# Patient Record
Sex: MALE | Race: WHITE
[De-identification: names, ages, dates, MRNs, and addresses within clinical notes are randomized per-mention and may not be internally consistent; named-entity substitution may affect disease eponyms.]

---

## 2022-08-09 NOTE — NUR
08/09/22 1333 Sandeep Jeong
CALL LIGHT WITHIN REACH. TETRACAINE AT 1322 IN LEFT EYE AND PLEDGETT
AT 1325

## 2022-12-18 ENCOUNTER — HOSPITAL ENCOUNTER (INPATIENT)
Dept: HOSPITAL 95 - ER | Age: 82
LOS: 3 days | DRG: 637 | End: 2022-12-21
Attending: INTERNAL MEDICINE | Admitting: INTERNAL MEDICINE
Payer: OTHER GOVERNMENT

## 2022-12-18 VITALS — HEIGHT: 72 IN | WEIGHT: 166.67 LBS | BODY MASS INDEX: 22.57 KG/M2

## 2022-12-18 DIAGNOSIS — E78.5: ICD-10-CM

## 2022-12-18 DIAGNOSIS — E11.11: Primary | ICD-10-CM

## 2022-12-18 DIAGNOSIS — R31.9: ICD-10-CM

## 2022-12-18 DIAGNOSIS — Z88.1: ICD-10-CM

## 2022-12-18 DIAGNOSIS — Z79.01: ICD-10-CM

## 2022-12-18 DIAGNOSIS — Z88.2: ICD-10-CM

## 2022-12-18 DIAGNOSIS — I10: ICD-10-CM

## 2022-12-18 DIAGNOSIS — N20.0: ICD-10-CM

## 2022-12-18 DIAGNOSIS — I48.91: ICD-10-CM

## 2022-12-18 DIAGNOSIS — J69.0: ICD-10-CM

## 2022-12-18 DIAGNOSIS — G93.41: ICD-10-CM

## 2022-12-18 DIAGNOSIS — Z28.21: ICD-10-CM

## 2022-12-18 DIAGNOSIS — Z91.038: ICD-10-CM

## 2022-12-18 DIAGNOSIS — F32.A: ICD-10-CM

## 2022-12-18 DIAGNOSIS — G47.33: ICD-10-CM

## 2022-12-18 DIAGNOSIS — M19.90: ICD-10-CM

## 2022-12-18 DIAGNOSIS — Z51.5: ICD-10-CM

## 2022-12-18 DIAGNOSIS — C22.7: ICD-10-CM

## 2022-12-18 DIAGNOSIS — Z79.899: ICD-10-CM

## 2022-12-18 DIAGNOSIS — Z20.822: ICD-10-CM

## 2022-12-18 DIAGNOSIS — E87.6: ICD-10-CM

## 2022-12-18 DIAGNOSIS — Z66: ICD-10-CM

## 2022-12-18 DIAGNOSIS — E11.9: ICD-10-CM

## 2022-12-18 LAB
ALBUMIN SERPL BCP-MCNC: 2.3 G/DL (ref 3.4–5)
ALBUMIN/GLOB SERPL: 0.6 {RATIO} (ref 0.8–1.8)
ALT SERPL W P-5'-P-CCNC: 30 U/L (ref 12–78)
ANION GAP SERPL CALCULATED.4IONS-SCNC: 21 MMOL/L (ref 6–16)
ANION GAP SERPL CALCULATED.4IONS-SCNC: 24 MMOL/L (ref 6–16)
AST SERPL W P-5'-P-CCNC: 37 U/L (ref 12–37)
BASOPHILS # BLD: 0 K/MM3 (ref 0–0.23)
BASOPHILS NFR BLD: 0 % (ref 0–2)
BILIRUB SERPL-MCNC: 1.2 MG/DL (ref 0.1–1)
BUN SERPL-MCNC: 46 MG/DL (ref 8–24)
BUN SERPL-MCNC: 46 MG/DL (ref 8–24)
CALCIUM SERPL-MCNC: 8.7 MG/DL (ref 8.5–10.1)
CALCIUM SERPL-MCNC: 9.6 MG/DL (ref 8.5–10.1)
CHLORIDE SERPL-SCNC: 91 MMOL/L (ref 98–108)
CHLORIDE SERPL-SCNC: 97 MMOL/L (ref 98–108)
CO2 SERPL-SCNC: 18 MMOL/L (ref 21–32)
CO2 SERPL-SCNC: 19 MMOL/L (ref 21–32)
CREAT SERPL-MCNC: 1.09 MG/DL (ref 0.6–1.2)
CREAT SERPL-MCNC: 1.14 MG/DL (ref 0.6–1.2)
DEPRECATED RDW RBC AUTO: 51.8 FL (ref 35.1–46.3)
EOSINOPHIL # BLD: 0 K/MM3 (ref 0–0.68)
EOSINOPHIL NFR BLD: 0 % (ref 0–6)
ERYTHROCYTE [DISTWIDTH] IN BLOOD BY AUTOMATED COUNT: 14.6 % (ref 11.7–14.2)
FLUAV RNA SPEC QL NAA+PROBE: NEGATIVE
FLUBV RNA SPEC QL NAA+PROBE: NEGATIVE
GLOBULIN SER CALC-MCNC: 3.8 G/DL (ref 2.2–4)
GLUCOSE SERPL-MCNC: 876 MG/DL (ref 70–99)
GLUCOSE SERPL-MCNC: 884 MG/DL (ref 70–99)
GLUCOSE SERPL-MCNC: 916 MG/DL (ref 70–99)
HCT VFR BLD AUTO: 40.5 % (ref 37–53)
HGB BLD-MCNC: 13.6 G/DL (ref 13.5–17.5)
LYMPHOCYTES # BLD: 0.16 K/MM3 (ref 0.84–5.2)
LYMPHOCYTES NFR BLD: 1 % (ref 21–46)
MAGNESIUM SERPL-MCNC: 2.5 MG/DL (ref 1.6–2.4)
MCHC RBC AUTO-ENTMCNC: 33.6 G/DL (ref 31.5–36.5)
MCV RBC AUTO: 98 FL (ref 80–100)
MONOCYTES # BLD: 0.16 K/MM3 (ref 0.16–1.47)
MONOCYTES NFR BLD: 1 % (ref 4–13)
NEUTS BAND NFR BLD MANUAL: 8 % (ref 0–8)
NEUTS SEG # BLD MANUAL: 16.6 K/MM3 (ref 1.96–9.15)
NEUTS SEG NFR BLD MANUAL: 90 % (ref 41–73)
NRBC # BLD AUTO: 0 K/MM3 (ref 0–0.02)
NRBC BLD AUTO-RTO: 0 /100 WBC (ref 0–0.2)
OSMOLALITY SERPL: 346 MOS/KG (ref 275–300)
PH BLDV: 7.32 [PH] (ref 7.34–7.37)
PLATELET # BLD AUTO: 281 K/MM3 (ref 150–400)
POTASSIUM SERPL-SCNC: 2.4 MMOL/L (ref 3.5–5.5)
POTASSIUM SERPL-SCNC: 3 MMOL/L (ref 3.5–5.5)
PROT SERPL-MCNC: 6.1 G/DL (ref 6.4–8.2)
RSV RNA SPEC QL NAA+PROBE: NEGATIVE
SARS-COV-2 RNA RESP QL NAA+PROBE: NEGATIVE
SODIUM SERPL-SCNC: 133 MMOL/L (ref 136–145)
SODIUM SERPL-SCNC: 137 MMOL/L (ref 136–145)
TOTAL CELLS COUNTED BLD: 100

## 2022-12-18 PROCEDURE — A9270 NON-COVERED ITEM OR SERVICE: HCPCS

## 2022-12-18 PROCEDURE — C1751 CATH, INF, PER/CENT/MIDLINE: HCPCS

## 2022-12-19 LAB
ALBUMIN SERPL BCP-MCNC: 1.9 G/DL (ref 3.4–5)
ALBUMIN SERPL BCP-MCNC: 2.2 G/DL (ref 3.4–5)
ALBUMIN/GLOB SERPL: 0.6 {RATIO} (ref 0.8–1.8)
ALBUMIN/GLOB SERPL: 0.6 {RATIO} (ref 0.8–1.8)
ALT SERPL W P-5'-P-CCNC: 28 U/L (ref 12–78)
ALT SERPL W P-5'-P-CCNC: 29 U/L (ref 12–78)
ANION GAP SERPL CALCULATED.4IONS-SCNC: 11 MMOL/L (ref 6–16)
ANION GAP SERPL CALCULATED.4IONS-SCNC: 18 MMOL/L (ref 6–16)
ANION GAP SERPL CALCULATED.4IONS-SCNC: 23 MMOL/L (ref 6–16)
ANION GAP SERPL CALCULATED.4IONS-SCNC: 25 MMOL/L (ref 6–16)
ANION GAP SERPL CALCULATED.4IONS-SCNC: 8 MMOL/L (ref 6–16)
ANION GAP SERPL CALCULATED.4IONS-SCNC: 9 MMOL/L (ref 6–16)
AST SERPL W P-5'-P-CCNC: 25 U/L (ref 12–37)
AST SERPL W P-5'-P-CCNC: 30 U/L (ref 12–37)
BASOPHILS # BLD AUTO: 0.09 K/MM3 (ref 0–0.23)
BASOPHILS NFR BLD AUTO: 1 % (ref 0–2)
BILIRUB SERPL-MCNC: 0.8 MG/DL (ref 0.1–1)
BILIRUB SERPL-MCNC: 1.2 MG/DL (ref 0.1–1)
BUN SERPL-MCNC: 36 MG/DL (ref 8–24)
BUN SERPL-MCNC: 43 MG/DL (ref 8–24)
BUN SERPL-MCNC: 44 MG/DL (ref 8–24)
BUN SERPL-MCNC: 45 MG/DL (ref 8–24)
BUN SERPL-MCNC: 45 MG/DL (ref 8–24)
BUN SERPL-MCNC: 48 MG/DL (ref 8–24)
CALCIUM SERPL-MCNC: 8.3 MG/DL (ref 8.5–10.1)
CALCIUM SERPL-MCNC: 8.9 MG/DL (ref 8.5–10.1)
CALCIUM SERPL-MCNC: 9.1 MG/DL (ref 8.5–10.1)
CALCIUM SERPL-MCNC: 9.3 MG/DL (ref 8.5–10.1)
CALCIUM SERPL-MCNC: 9.4 MG/DL (ref 8.5–10.1)
CALCIUM SERPL-MCNC: 9.5 MG/DL (ref 8.5–10.1)
CHLORIDE SERPL-SCNC: 100 MMOL/L (ref 98–108)
CHLORIDE SERPL-SCNC: 107 MMOL/L (ref 98–108)
CHLORIDE SERPL-SCNC: 112 MMOL/L (ref 98–108)
CHLORIDE SERPL-SCNC: 114 MMOL/L (ref 98–108)
CHLORIDE SERPL-SCNC: 119 MMOL/L (ref 98–108)
CHLORIDE SERPL-SCNC: 98 MMOL/L (ref 98–108)
CO2 SERPL-SCNC: 16 MMOL/L (ref 21–32)
CO2 SERPL-SCNC: 19 MMOL/L (ref 21–32)
CO2 SERPL-SCNC: 20 MMOL/L (ref 21–32)
CO2 SERPL-SCNC: 24 MMOL/L (ref 21–32)
CO2 SERPL-SCNC: 25 MMOL/L (ref 21–32)
CO2 SERPL-SCNC: 26 MMOL/L (ref 21–32)
CREAT SERPL-MCNC: 0.86 MG/DL (ref 0.6–1.2)
CREAT SERPL-MCNC: 0.94 MG/DL (ref 0.6–1.2)
CREAT SERPL-MCNC: 1.03 MG/DL (ref 0.6–1.2)
CREAT SERPL-MCNC: 1.1 MG/DL (ref 0.6–1.2)
CREAT SERPL-MCNC: 1.12 MG/DL (ref 0.6–1.2)
CREAT SERPL-MCNC: 1.14 MG/DL (ref 0.6–1.2)
DEPRECATED RDW RBC AUTO: 54 FL (ref 35.1–46.3)
EOSINOPHIL # BLD AUTO: 0.23 K/MM3 (ref 0–0.68)
EOSINOPHIL NFR BLD AUTO: 1 % (ref 0–6)
ERYTHROCYTE [DISTWIDTH] IN BLOOD BY AUTOMATED COUNT: 14.9 % (ref 11.7–14.2)
GLOBULIN SER CALC-MCNC: 3.3 G/DL (ref 2.2–4)
GLOBULIN SER CALC-MCNC: 3.4 G/DL (ref 2.2–4)
GLUCOSE SERPL-MCNC: 211 MG/DL (ref 70–99)
GLUCOSE SERPL-MCNC: 277 MG/DL (ref 70–99)
GLUCOSE SERPL-MCNC: 436 MG/DL (ref 70–99)
GLUCOSE SERPL-MCNC: 446 MG/DL (ref 70–99)
GLUCOSE SERPL-MCNC: 593 MG/DL (ref 70–99)
GLUCOSE SERPL-MCNC: 674 MG/DL (ref 70–99)
GLUCOSE SERPL-MCNC: 826 MG/DL (ref 70–99)
GLUCOSE SERPL-MCNC: 849 MG/DL (ref 70–99)
GLUCOSE SERPL-MCNC: 873 MG/DL (ref 70–99)
GLUCOSE UR-MCNC: (no result) MG/DL
GLUCOSE UR-MCNC: (no result) MG/DL
HCT VFR BLD AUTO: 38.9 % (ref 37–53)
HGB BLD-MCNC: 12.7 G/DL (ref 13.5–17.5)
IMM GRANULOCYTES # BLD AUTO: 0.06 K/MM3 (ref 0–0.1)
IMM GRANULOCYTES NFR BLD AUTO: 0 % (ref 0–1)
KETONES UR STRIP-MCNC: (no result) MG/DL
KETONES UR STRIP-MCNC: (no result) MG/DL
LEUKOCYTE ESTERASE UR QL STRIP: (no result)
LEUKOCYTE ESTERASE UR QL STRIP: (no result)
LYMPHOCYTES # BLD AUTO: 0.39 K/MM3 (ref 0.84–5.2)
LYMPHOCYTES NFR BLD AUTO: 2 % (ref 21–46)
MAGNESIUM SERPL-MCNC: 2.4 MG/DL (ref 1.6–2.4)
MAGNESIUM SERPL-MCNC: 2.7 MG/DL (ref 1.6–2.4)
MAGNESIUM SERPL-MCNC: 2.7 MG/DL (ref 1.6–2.4)
MCHC RBC AUTO-ENTMCNC: 32.6 G/DL (ref 31.5–36.5)
MCV RBC AUTO: 100 FL (ref 80–100)
MONOCYTES # BLD AUTO: 0.51 K/MM3 (ref 0.16–1.47)
MONOCYTES NFR BLD AUTO: 3 % (ref 4–13)
NEUTROPHILS # BLD AUTO: 17.05 K/MM3 (ref 1.96–9.15)
NEUTROPHILS NFR BLD AUTO: 93 % (ref 41–73)
NRBC # BLD AUTO: 0 K/MM3 (ref 0–0.02)
NRBC BLD AUTO-RTO: 0 /100 WBC (ref 0–0.2)
PH BLDA: 7.37 [PH] (ref 7.35–7.45)
PH BLDA: 7.42 [PH] (ref 7.35–7.45)
PHOSPHATE SERPL-MCNC: 2.1 MG/DL (ref 2.5–4.9)
PHOSPHATE SERPL-MCNC: 2.2 MG/DL (ref 2.5–4.9)
PHOSPHATE SERPL-MCNC: 3.3 MG/DL (ref 2.5–4.9)
PLATELET # BLD AUTO: 284 K/MM3 (ref 150–400)
POTASSIUM SERPL-SCNC: 2.6 MMOL/L (ref 3.5–5.5)
POTASSIUM SERPL-SCNC: 2.6 MMOL/L (ref 3.5–5.5)
POTASSIUM SERPL-SCNC: 3 MMOL/L (ref 3.5–5.5)
POTASSIUM SERPL-SCNC: 3.4 MMOL/L (ref 3.5–5.5)
POTASSIUM SERPL-SCNC: 3.4 MMOL/L (ref 3.5–5.5)
POTASSIUM SERPL-SCNC: 4.2 MMOL/L (ref 3.5–5.5)
PROT SERPL-MCNC: 5.2 G/DL (ref 6.4–8.2)
PROT SERPL-MCNC: 5.6 G/DL (ref 6.4–8.2)
PROT UR STRIP-MCNC: (no result) MG/DL
PROT UR STRIP-MCNC: (no result) MG/DL
RBC #/AREA URNS HPF: (no result) /HPF (ref 0–2)
RBC #/AREA URNS HPF: (no result) /HPF (ref 0–2)
SODIUM SERPL-SCNC: 141 MMOL/L (ref 136–145)
SODIUM SERPL-SCNC: 141 MMOL/L (ref 136–145)
SODIUM SERPL-SCNC: 144 MMOL/L (ref 136–145)
SODIUM SERPL-SCNC: 148 MMOL/L (ref 136–145)
SODIUM SERPL-SCNC: 148 MMOL/L (ref 136–145)
SODIUM SERPL-SCNC: 152 MMOL/L (ref 136–145)
SP GR SPEC: 1.01 (ref 1–1.02)
SP GR SPEC: 1.01 (ref 1–1.02)
URN SPEC COLLECT METH UR: (no result)
UROBILINOGEN UR STRIP-MCNC: (no result) MG/DL
UROBILINOGEN UR STRIP-MCNC: (no result) MG/DL

## 2022-12-19 NOTE — NUR
SUMMARY
 
NEURO: PT A/O X0. PT STATES IT IS 1926 AND THAT HIS NAME IS CHANTAL. PT ABLE TO
WEAKLY FOLLOW COMMANDS IN ALL EXTREMETIES WITH EQUAL STRENGTH. PER ER REPORT,
PT'S SON STATED HE HAD STROKE LIKE SYMPTOMS WITH A L SIDE FACIAL DROOP ON
12/12/22 AND THEN FELL ON 12/14/22, PT LOST APPETITE AND SON HAD TO LIFT PT TO
THE BATHROOM. PUPILS EQUAL AND REACTIVE. VISUAL HALLUCINATIONS PRESENT.
 
RESPIRATORY: IMAGING SHOWED LOWER LOBE PNA. PT WAS ON 6L NC FROM ER, HAS SINCE
INCREASED TO 14 L HFNC- MANAGED BY RESPIRATORY CARE. PT'S EXTREMETIES ARE
DUSKY AND CYANOTIC, UNABLE TO FIND AN SPO2 PLETH DESPITE ATTEMPTS WITH
DIFFERENT PULSE OX. ABG DRAWN SHOWED PO2 OF 53, REPEAT PO2 OF 67. PROVIDER
MADE AWARE. RT STATING PT DOESNT NEED AIRVO AT THIS TIME. PT HAS HX OF SLEEP
APNEA. NO COUGH OR SECRETIONS NOTED. NEGATIVE RESPIRATORY PANEL.
 
CARDIO: PT HAS A HX OF AFIB, PT WAS IN AFIB WITH RVR- 5MG METOPROLOL PUSH
GIVEN. FAINT RADIAL PULSES. DOPPLER BLE. +1 EDEMA BLE. BEAR HUGGER TO BLE.
TEMP 96.5.
 
GI: SEE IMAGING- BOWEL OBSTRUCTION/ PERFORATION NOT EXCLUDED. PANCREATIC MASS
SEEN ON BEDSIDE ULTRASOUND PER ER REPORT. NO BM THIS SHIFT. CANCER ANTIGEN
19-9 SENT, SON UPDATED ON CONCERN.
 
: CONDOM CATHETER IN PLACE, PINK/JAMSHID URINE. INCONTINENT.
 
SKIN: FOREHEAD BRUISING AND LARGE CRUSTED SCAB- PHOTOS IN CHART.
STAGE THREE PRESSURE AREAS AND
LARGE AREA OF MACERATION ON SACRUM- PHOTOS IN CHART. PT MOTTLED TO HIP. COOL
CYANOTIC, EXTREMETIES. DRY ORAL MUCOSA.
 
PT'S POTASSIUM IS CRITICALLY LOW, BLOOD SUGARS CRITICALLY HIGH. PT HAS
RECIEVED 80MEQ OF POTASSIUM AND KPHOS. UNABLE TOLERATE PO

## 2022-12-19 NOTE — NUR
ASSUMED CARE
PT IS A&O X1; EXTREMELY CONFUSED AND ORIENTED TO SELF. AGITATED W/ ANY
PHYSICAL INTERVENTION. SPO2 >92% ON 14L HF NC. MAP >65. INSULIN GTT @
4UNITS/HR; NS @ 75ML/HR; K-DUR @ 30MLS/HR. INSULIN AND FLUIDS STARTED AT 0800
PER DR BALDERRAMA. PT INCONTINENT W/ SMALL AMOUNTS OF URINATION AT A TIME; URINE IS
BRIGHT RED.
 
PUPILS EQUAL BILATERALLY. PT UNABLE TO BE REORIENTED. LEFT EYELID STAYS
CLOSED/PARTIALLY OPEN WHILE RIGHT EYE OPENS WNL. PT PULLS AT RESTRAINT W/
RA MORE THAN LA; LA TENSE. PT IS TREMULOUS.

## 2022-12-19 NOTE — NUR
SHIFT SUMMARY
NO ACUTE CHANGES IN MENTATION SINCE UPDATE. SPO2 >92% ON 10L NC; MAP >60.
INSULIN GTT @ 2UNITS/HR; NS @ 75MLS/HR; KPHOS 20MM RUNNING OVER 4 HOURS. URINE
HAS GOTTEN DARKER FROM BRIGHT RED TO COLA COLORED OVER SHIFT. LUNG SOUNDS
CLEAR W/ DIM BASES. UGALDE PATENT AND DRAINING TO GRAVITY. PT GIVEN 1 TIME DOES
OF ATIVAN 0.5MG; PT WAS CALM AND RESTING FOR MAJORITY OF SHIFT; WOKE UP AT
1700 AGITATED AND ATTEMPTING TO PULL LINES.

## 2022-12-20 LAB
ALBUMIN SERPL BCP-MCNC: 1.8 G/DL (ref 3.4–5)
ANION GAP SERPL CALCULATED.4IONS-SCNC: 11 MMOL/L (ref 6–16)
ANION GAP SERPL CALCULATED.4IONS-SCNC: 6 MMOL/L (ref 6–16)
ANION GAP SERPL CALCULATED.4IONS-SCNC: 7 MMOL/L (ref 6–16)
BUN SERPL-MCNC: 35 MG/DL (ref 8–24)
BUN SERPL-MCNC: 36 MG/DL (ref 8–24)
BUN SERPL-MCNC: 41 MG/DL (ref 8–24)
CA-I BLD-SCNC: 1.09 MMOL/L (ref 1.1–1.46)
CALCIUM SERPL-MCNC: 8.9 MG/DL (ref 8.5–10.1)
CALCIUM SERPL-MCNC: 9 MG/DL (ref 8.5–10.1)
CALCIUM SERPL-MCNC: 9.2 MG/DL (ref 8.5–10.1)
CHLORIDE BLD-SCNC: 92 MMOL/L (ref 98–108)
CHLORIDE SERPL-SCNC: 115 MMOL/L (ref 98–108)
CHLORIDE SERPL-SCNC: 116 MMOL/L (ref 98–108)
CHLORIDE SERPL-SCNC: 118 MMOL/L (ref 98–108)
CO2 BLD-SCNC: 20 MMOL/L (ref 21–32)
CO2 SERPL-SCNC: 23 MMOL/L (ref 21–32)
CO2 SERPL-SCNC: 26 MMOL/L (ref 21–32)
CO2 SERPL-SCNC: 28 MMOL/L (ref 21–32)
CREAT BLD-MCNC: 1.2 MG/DL (ref 0.8–1.3)
CREAT SERPL-MCNC: 0.82 MG/DL (ref 0.6–1.2)
CREAT SERPL-MCNC: 0.85 MG/DL (ref 0.6–1.2)
CREAT SERPL-MCNC: 0.86 MG/DL (ref 0.6–1.2)
GLUCOSE BLDC GLUCOMTR-MCNC: >700 MG/DL (ref 70–99)
GLUCOSE SERPL-MCNC: 155 MG/DL (ref 70–99)
GLUCOSE SERPL-MCNC: 288 MG/DL (ref 70–99)
GLUCOSE SERPL-MCNC: 53 MG/DL (ref 70–99)
HCT VFR BLD CALC: 45 % (ref 41–53)
HGB BLD CALC-MCNC: 15.3 G/DL (ref 13.5–17.5)
MAGNESIUM SERPL-MCNC: 2.1 MG/DL (ref 1.6–2.4)
PHOSPHATE SERPL-MCNC: 1.4 MG/DL (ref 2.5–4.9)
POTASSIUM BLD-SCNC: 3.1 MMOL/L (ref 3.5–5.5)
POTASSIUM SERPL-SCNC: 3.2 MMOL/L (ref 3.5–5.5)
POTASSIUM SERPL-SCNC: 3.5 MMOL/L (ref 3.5–5.5)
POTASSIUM SERPL-SCNC: 4 MMOL/L (ref 3.5–5.5)
SODIUM BLD-SCNC: 131 MMOL/L (ref 135–148)
SODIUM SERPL-SCNC: 149 MMOL/L (ref 136–145)
SODIUM SERPL-SCNC: 150 MMOL/L (ref 136–145)
SODIUM SERPL-SCNC: 151 MMOL/L (ref 136–145)

## 2022-12-20 NOTE — NUR
PT ARRIVED TO UNIT AT 2230 ACCOMPANIED BY GRANDSON AND GRANDSON'S WIFE,
HAYES, WHO IS A HOSPICE NURSE. SHE IS REQUESTING TO STAY WITH HIM AND WOULD
LIKE TO PROVIDE PM CARE, IF SHE IS HERE WHEN HE PASSES. SON, JUAN DIEGO, IS TO BE
NOTIFIED UPON PASSING. ISABEL'S MORTUARY IS  HOME. HAYES REQUESTS PT
NOT BE DISTURBED FOR REPOSITIONING, ONLY FOR CHANGES IF SOILED. EMILIE IS A POW
AND, IN HER OPINION, IS HAVING SEVERE PTSD, AS HE HAS BEEN COMBATIVE IN THE
PAST, WHEN NOT MEDICATED WITH ROXANOL AND ATIVAN.

## 2022-12-20 NOTE — NUR
END OF SHIFT REPORT:
PER MD INSULIN SHUT OFF UNTIL >200. D5 1/2 NS AT 75 ML/HR. SEE REVIEW FOR PTS
LAST BLOOD GLUCOSE LEVELS.
NEURO: PT INTERMITTENTLY ORIENTED TO SELF AND OCCASIONAL FOLLOWS COMMAND:
WIGGLE YOUR TOES OR GIVE ME A THUMBS UP. LABILE. WILL BE IMPULSIVE AND PULL
AND LINES AND WIRES. ATIVAN 0.5MG IV DOSES APPEAR TO ALLEVIATE THRASHING AND
SCREAMING BEHAVIOR.
RESPIRATORY: TACHYPNEIC AND SHALLOW RESPIRATIONS. 10L HFNC SAT >95%. BREATH
SOUNDS CLEAR BILATERALLY AND DIMINISHED IN THE BASES.
CARDIAC: AFIB. HR 110s. BP AVERAGING 100s/70s WITH MAPS >65. CAP REFILL > 3
SECONDS.
GI/: INCONTINENT. NO BOWEL MOVEMENT OVERNIGHT. BOWEL SOUNDS HYPOACTIVE AND
NO GRIMACING NOTED TO FACE WITH PALPATION TO ABDOMEN. UGALDE CATHETER SECURED
AND DRAINING TO GRAVITY WITH NO DEPENDENT LOOPS. OUTPUT IS RED/BROWN WITH RED
SEDIMENT. AVERAGING 30-40 ML/HR OF OUTPUT.
MUSCULOSKELETA: LLE LIMITED RANGE OF MOTION DUE TO LOCKED KNEE.  LIMBS CAN
OVERCOME RESISTANCE AS DETERMINED BY PATIENTS THRASHING.
INTEGUMENTARY: SCAB NOTED TO RIGHT SIDE OF FOREHEAD. DIFFUSE BRUISING TO BUE.
SKIN TEAR AND REDNESS NOTED TO COCCYX. TURNING PT Q2 AND APPLYING ZINC CREAM.
MEPILEX PRESENT. SKIN COOL AND FRAGILE. CYANOTIC TOES AND MOTTLING TO RIGHT
KNEE HAVE IMPROVED SINCE START OF SHIFT.

## 2022-12-20 NOTE — NUR
multiple visits to pt. He is aggitited and displaying significant pain and
spasms. He is also struggling with a sore mouth. With assitance from staff.
Extensive oral care. He has large casts in the back of the throat and his lipt
tounge and mouth has breakdown and slough.
  Used bite block and multiple moist sponges and cleared copious dried
secretions from oral pharnyx. Reviewed with repitory therapist. ordered magic
mouthwash.
  Pt frail and displying delirium. Review with son the past few months. States
[facility] He [facility] and has had to
see the urologist several times. He has been having great struggles feeling
spasms and need to void and then cant.
  son relays rapid loss in balance and ability to ambulate. Review of
diagnositics presesnt possible malignancy. Reviw with physician and son
presents decline.
  Relayed to son to speak with his family about his future care needs and
decline. Expressed concern for increased suffering. Son relayed that the
urologist discussed hospice due to the facet he needs medical interventions
and possible surgery and pt does not want treatments.
   Son agreed comfort measures and transition to hospice would better fit his
needs and his wishes. Difficult conversation with son He lost his wife from
cancer and his mother. Will continue antibiotics for now and insulin coverage
and add comfort medication. Goal is to get him out of restraints and some
comfort.
   Nursing relayed to son that with the added sedation pt may pass sooner. Son
was understanding....feels his pain has been greatly increasing and
understands it is time for hospice.

## 2022-12-20 NOTE — NUR
NOTIFIED MD OF PT BG DROPPING FROM 122 TO 89. SHUT OFF INSULIN. ORDERED TO
INCREASE RATE OF D5 1/2 NS  ML/HR.

## 2022-12-20 NOTE — NUR
SPOKE WITH MD. UPDATED ON PATIENTS BG WITH INSULIN GTT OFF. MOST RECENT
SAMPLES WERE 145 . INSTRUCTED TO KEEP INSULIN GTT OFF UNLESS BG GETS
>200.

## 2022-12-20 NOTE — NUR
END OF SHIFT SUMMARY
 
PT STARTED SHIFT RESTLESS AND YELLING REQUIRING BEDSIDE SITTER AND RESTRAINTS.
PALLIATIVE CARE CONSULT AND MD TO BEDSIDE. FAMILY WISHES TO TRANSITION TO
COMFORT CARE, APPRECIATE PALLIATIVE RN'S MULTIPLE REASSESSMENTS AND ASSIST
WITH MEDICATION CHANGES. MEDICATED WITH ROXANOL AND ATIVAN PRN. TYLENOL GIVEN
DC X2. SON, COUSIN, GRANDSON AND GRANDDAUGHTER TO BEDSIDE THIS EVENING. ABLE
TO D/C RESTRAINTS AND UGALDE AT 1600, PT MUCH MORE COMFORTABLE AFTER UGALDE
D/C'D. FAMILY UPDATED ON POC, ALL QUESTIONS ANSWERED AND EMOTIONAL SUPPORT
PROVIDED. COMFORT CART ORDERED. PT REPOSITIONED PRN FOR COMFORT.

## 2022-12-21 NOTE — NUR
Spiritual care visit conducted. Pt had recently  and his son Joselito is
leaving as I am coming the room. He had stated that he is overwhelmed and
needs to leave. I then provide spiritual care to pt's Granddaughter in law,
Savannah, and conduct a life review, supply grief support and a calming
presence. Savannah grieves appropriately and displays that she has excellent
coping skills and resources. Savannah shows signs of being comforted.

## 2022-12-21 NOTE — NUR
NIGHT SHIFT SUMMARY:
PT UNRESPONSIVE SINCE ADMIT TO FLOOR. GRANDAUGHTER-IN-LAW AT BEDSIDE; SHE IS A
HOSPICE NURSE AND VERY VERSED IN COMFORT CARE MEDICATIONS AND MAINTAINS
COMMUNICATION WITH STAFF. HAS DECLINED ANY REPOSITIONING FOR PATIENT AND
REQUESTS ONLY PERTINENT THINGS, SUCH AS CHANGES IF SOILED. HAS NOT VOIDED OR
HAD BM SINCE ARRIVAL. ORAL CARE DECLINED CONSIDERING PATIENT'S COMFORT LEVEL.
GRADDAUGHTER VOICES UNDERSTANDING OF DECLINATION OF SUCH CARE. WILL REPORT TO
ONCOMING RN.

## 2022-12-21 NOTE — NUR
PT PASSED AT 1142, PROVIDER AND FAMILY NOTIFIED.  CHARGE NURSE WAS ALSO
NOTIFIED AND WILL CONTACT THE MORTUARY OF THEIR CHOICE, [employer].  HIS SON IS
ON THE WAY TO SEE HIM, PALLIATIVE CARE IS ALSO CURRENTLY IN THE ROOM.
SPIRITUAL  HAS ALSO BEEN NOTIFIED.